# Patient Record
Sex: FEMALE
[De-identification: names, ages, dates, MRNs, and addresses within clinical notes are randomized per-mention and may not be internally consistent; named-entity substitution may affect disease eponyms.]

---

## 2021-07-02 ENCOUNTER — DASHBOARD ENCOUNTERS (OUTPATIENT)
Age: 61
End: 2021-07-02

## 2021-07-02 ENCOUNTER — OFFICE VISIT (OUTPATIENT)
Dept: URBAN - METROPOLITAN AREA TELEHEALTH 2 | Facility: TELEHEALTH | Age: 61
End: 2021-07-02
Payer: COMMERCIAL

## 2021-07-02 DIAGNOSIS — Z83.71 FAMILY HISTORY OF POLYPS IN THE COLON: ICD-10-CM

## 2021-07-02 PROCEDURE — 99202 OFFICE O/P NEW SF 15 MIN: CPT | Performed by: INTERNAL MEDICINE

## 2021-07-02 RX ORDER — SODIUM SULFATE, MAGNESIUM SULFATE, AND POTASSIUM CHLORIDE 17.75; 2.7; 2.25 G/1; G/1; G/1
12 TABLETS TABLET ORAL
Qty: 24 TABLETS | Refills: 0 | OUTPATIENT
Start: 2021-07-02 | End: 2021-07-03

## 2021-07-02 NOTE — HPI-TODAY'S VISIT:
Patient referred by Sheldon BORREGO for CRC screening. This is a Telephone OV to which patient has agreed to. Copy of this consult OV sent to Dr. Lo. Limitations of telehealth discussed; she understands and agrees to proceed. 61 yo pt who's due for CRC screning. She has a strong FHx colon polyps in both parents. She has been asx. No constitutional sxs and denies change in bowel pattern no anorexia, weight loss or bleeding. Normal CPE a mo ago x for HLD. No COVID-19 exposure and has received 2 doses of COVID-19 vaccine. No other complaints.

## 2021-08-02 ENCOUNTER — OFFICE VISIT (OUTPATIENT)
Dept: URBAN - METROPOLITAN AREA SURGERY CENTER 18 | Facility: SURGERY CENTER | Age: 61
End: 2021-08-02

## 2021-08-06 ENCOUNTER — OFFICE VISIT (OUTPATIENT)
Dept: URBAN - METROPOLITAN AREA LAB 3 | Facility: LAB | Age: 61
End: 2021-08-06
Payer: COMMERCIAL

## 2021-08-06 DIAGNOSIS — Z12.11 COLON CANCER SCREENING: ICD-10-CM

## 2021-08-06 DIAGNOSIS — Z83.71 FAMILY HISTORY OF COLON POLYPS: ICD-10-CM

## 2021-08-06 PROCEDURE — G0105 COLORECTAL SCRN; HI RISK IND: HCPCS | Performed by: INTERNAL MEDICINE
